# Patient Record
Sex: MALE | Race: WHITE | NOT HISPANIC OR LATINO | URBAN - METROPOLITAN AREA
[De-identification: names, ages, dates, MRNs, and addresses within clinical notes are randomized per-mention and may not be internally consistent; named-entity substitution may affect disease eponyms.]

---

## 2018-09-05 ENCOUNTER — OFFICE VISIT - RIVER FALLS (OUTPATIENT)
Dept: FAMILY MEDICINE | Facility: CLINIC | Age: 58
End: 2018-09-05

## 2018-09-05 ASSESSMENT — MIFFLIN-ST. JEOR: SCORE: 2218.1

## 2021-09-28 ENCOUNTER — OFFICE VISIT - RIVER FALLS (OUTPATIENT)
Dept: FAMILY MEDICINE | Facility: CLINIC | Age: 61
End: 2021-09-28

## 2021-09-28 ASSESSMENT — MIFFLIN-ST. JEOR: SCORE: 2208.12

## 2022-02-12 VITALS
HEIGHT: 73 IN | HEART RATE: 60 BPM | OXYGEN SATURATION: 98 % | WEIGHT: 297 LBS | TEMPERATURE: 97.2 F | DIASTOLIC BLOOD PRESSURE: 78 MMHG | SYSTOLIC BLOOD PRESSURE: 120 MMHG | RESPIRATION RATE: 20 BRPM | BODY MASS INDEX: 39.36 KG/M2

## 2022-02-12 VITALS
SYSTOLIC BLOOD PRESSURE: 126 MMHG | DIASTOLIC BLOOD PRESSURE: 70 MMHG | HEART RATE: 64 BPM | TEMPERATURE: 97.4 F | HEIGHT: 73 IN | WEIGHT: 299.2 LBS | BODY MASS INDEX: 39.65 KG/M2

## 2022-02-15 NOTE — NURSING NOTE
CAGE Assessment Entered On:  9/28/2021 11:55 AM CDT    Performed On:  9/28/2021 11:55 AM CDT by Darren Israel CMA               Assessment   Have you ever felt you should cut down on your drinking :   No   Have people annoyed you by criticizing your drinking :   No   Have you ever felt bad or guilty about your drinking :   No   Have you ever taken a drink first thing in the morning to steady your nerves or get rid of a hangover (Eye-opener) :   No   CAGE Score :   0    Darren Israel CMA - 9/28/2021 11:55 AM CDT

## 2022-02-15 NOTE — PROGRESS NOTES
Patient:   OFELIA LEZAMA            MRN: [id number]            FIN: [id number]               Age:   61 years     Sex:  Male     :  1960   Associated Diagnoses:   Epistaxis   Author:   Camilo Phillip PA-C      Chief Complaint   2021 10:46 AM CDT   Pt here for episodes of epistaxies from right nostril that happened last night.  Pt states he has had these sx on and off x 2-3 years.      History of Present Illness   Chief complaint and symptoms noted above and confirmed with patient   right nostril nose bleed since last night, it got it to stop but it started again this morning  not on aspirin or blood thinners  has not been cauterized before  it has been happening about once a month for about 2 years      Review of Systems   Constitutional:  Negative.    Ear/Nose/Mouth/Throat:       Epistaxis: Right nostril.    Respiratory:  Negative.       Health Status   Allergies:    Allergic Reactions (Selected)  No known allergies   Medications: not on any regular medications   Problem list:    All Problems  Morbid obesity / SNOMED CT 449326976 / Probable      Histories   Past Medical History:    No active or resolved past medical history items have been selected or recorded.   Family History:    No family history items have been selected or recorded.      Physical Examination   Vital Signs   2021 10:46 AM CDT Temperature Tympanic 97.2 DegF  LOW    Peripheral Pulse Rate 60 bpm    Pulse Site Radial artery    Respiratory Rate 20 br/min    Systolic Blood Pressure 120 mmHg    Diastolic Blood Pressure 78 mmHg    Mean Arterial Pressure 92 mmHg    BP Site Right arm    Oxygen Saturation 98 %      Measurements from flowsheet : Measurements   2021 10:46 AM CDT Height Measured - Standard 72.5 in    Weight Measured - Standard 297 lb    BSA 2.62 m2    Body Mass Index 39.72 kg/m2  HI      General:  No acute distress.    HENT:  left nare is normal; there is active bleeding from the right nare,  oxymetazoline is sprayed  into nostril and nasal clamp is applied, then nare is flushed with saline,  a small area of bleeding is present in anterior nare, briefly treated with silver nitrate, no further bleeding is seen.       Impression and Plan   Diagnosis     Epistaxis ([id number] R04.0).     Summary:  epistaxis is controlled, advised to apply bacitracin to nare bid for next 5 days, because of recurrent nature of this will refer to ENT.    Orders     Orders   Requests (Consults / Referrals):  Referral (Request) (Order): 9/28/2021 11:30 AM CDT, Referred to: Otolaryngology (ENT), Reason for referral: recurrent epistaxis, Epistaxis.     Orders   Charges (Evaluation and Management):  02263 office o/p est low 20-29 min (Charge) (Order): Quantity: 1, Epistaxis.

## 2022-02-15 NOTE — NURSING NOTE
Depression Screening Entered On:  9/28/2021 11:55 AM CDT    Performed On:  9/28/2021 11:55 AM CDT by Darren Israel CMA               Depression Screening   Little Interest - Pleasure in Activities :   Not at all   Feeling Down, Depressed, Hopeless :   Not at all   Initial Depression Screen Score :   0 Score   Poor Appetite or Overeating :   Not at all   Trouble Falling or Staying Asleep :   Not at all   Feeling Tired or Little Energy :   Not at all   Feeling Bad About Yourself :   Not at all   Trouble Concentrating :   Not at all   Moving or Speaking Slowly :   Not at all   Thoughts Better Off Dead or Hurting Self :   Not at all   Difficulty at Work, Home, Getting Along :   Not difficult at all   Detailed Depression Screen Score :   0    Total Depression Screen Score :   0    Darren Israel CMA - 9/28/2021 11:55 AM CDT

## 2022-02-15 NOTE — NURSING NOTE
Comprehensive Intake Entered On:  9/28/2021 10:53 AM CDT    Performed On:  9/28/2021 10:46 AM CDT by Darren Israel CMA               Summary   Chief Complaint :   Pt here for episodes of epistaxies from right nostril that happened last night.  Pt states he has had these sx on and off x 2-3 years.   Weight Measured :   297 lb(Converted to: 297 lb 0 oz, 134.717 kg)    Height Measured :   72.5 in(Converted to: 6 ft 0 in, 184.15 cm)    Body Mass Index :   39.72 kg/m2 (HI)    Body Surface Area :   2.62 m2   Systolic Blood Pressure :   120 mmHg   Diastolic Blood Pressure :   78 mmHg   Mean Arterial Pressure :   92 mmHg   Peripheral Pulse Rate :   60 bpm   BP Site :   Right arm   Pulse Site :   Radial artery   Temperature Tympanic :   97.2 DegF(Converted to: 36.2 DegC)  (LOW)    Respiratory Rate :   20 br/min   Oxygen Saturation :   98 %   Darren Israel CMA - 9/28/2021 10:46 AM CDT   Health Status   Allergies Verified? :   Yes   Medication History Verified? :   Yes   Medical History Verified? :   Yes   Pre-Visit Planning Status :   Not completed   Tobacco Use? :   Never smoker   Darren Israel CMA - 9/28/2021 10:46 AM CDT   Meds / Allergies   (As Of: 9/28/2021 10:53:41 AM CDT)   Allergies (Active)   No known allergies  Estimated Onset Date:   Unspecified ; Created By:   KATELYN Zarate CMA, Jessica; Reaction Status:   Active ; Substance:   No known allergies ; Type:   Allergy ; Updated By:   KATELYN Zarate CMA, Jessica; Reviewed Date:   9/28/2021 10:51 AM CDT        Medication List   (As Of: 9/28/2021 10:53:41 AM CDT)   Prescription/Discharge Order    predniSONE  :   predniSONE ; Status:   Processing ; Ordered As Mnemonic:   predniSONE 20 mg oral tablet ; Ordering Provider:   Richard Bolden MD; Action Display:   Complete ; Catalog Code:   predniSONE ; Order Dt/Tm:   9/28/2021 10:46:57 AM CDT          amoxicillin-clavulanate  :   amoxicillin-clavulanate ; Status:   Processing ; Ordered As Mnemonic:   Augmentin 875 mg oral  tablet ; Ordering Provider:   Richard Bolden MD; Action Display:   Complete ; Catalog Code:   amoxicillin-clavulanate ; Order Dt/Tm:   9/28/2021 10:46:59 AM CDT            Social History   Social History   (As Of: 9/28/2021 10:53:41 AM CDT)   Tobacco:  Current      Never (less than 100 in lifetime), Snuff, 45 year(s).   (Last Updated: 9/28/2021 10:46:47 AM CDT by Darren Israel CMA)          Electronic Cigarette/Vaping:        Electronic Cigarette Use: Never.   (Last Updated: 9/28/2021 10:46:52 AM CDT by Darren Israel CMA)

## 2022-02-15 NOTE — PROGRESS NOTES
Patient:   OFELIA LEZAMA            MRN: [id number]            FIN: [id number]               Age:   58 years     Sex:  Male     :  1960   Associated Diagnoses:   Pneumonia   Author:   Richard Bolden MD      Chief Complaint   2018 8:32 AM CDT     Coughing x 2 weeks     Chief complaint and symptoms noted above confirmed with patient.      History of Present Illness             The patient presents with cough.  The cough is described as hacking and paroxysmal.  The severity of the cough is moderate.  The cough is constant.  The cough has lasted for 2 week(s).  Exacerbating factors consist of lying flat and recent illness.  Relieving factors consist of none.  Associated symptoms consist of chills, fever, denies chest pain, denies nasal congestion and denies rhinorrhea.        Review of Systems   Constitutional:  Negative except as documented in history of present illness.    Ear/Nose/Mouth/Throat:  Negative.    Respiratory:  Negative except as documented in history of present illness.    Cardiovascular:  Negative.       Health Status   Allergies:    Allergic Reactions (Selected)  No known allergies   Medications:  (Selected)      Problem list:    All Problems  Morbid obesity / SNOMED CT [id number] / Probable      Histories   Past Medical History:    No active or resolved past medical history items have been selected or recorded.   Family History:    No family history items have been selected or recorded.   Procedure history:    No active procedure history items have been selected or recorded.   Social History:        Tobacco Assessment: Current            Snuff, 45 year(s).        Physical Examination   Vital Signs   2018 8:32 AM CDT Temperature Tympanic 97.4 DegF  LOW    Peripheral Pulse Rate 64 bpm    Pulse Site Radial artery    HR Method Manual    Systolic Blood Pressure 126 mmHg    Diastolic Blood Pressure 70 mmHg    Mean Arterial Pressure 89 mmHg    BP Site Right arm    BP Method Manual       Measurements from flowsheet : Measurements   9/5/2018 8:32 AM CDT Height Measured - Standard 72.5 in    Weight Measured - Standard 299.2 lb    BSA 2.63 m2    Body Mass Index 40.02 kg/m2  HI      General:  Alert and oriented, No acute distress.    Eye:  Normal conjunctiva.    HENT:  Normocephalic, Oral mucosa is moist, No pharyngeal erythema.    Neck:  Supple, Non-tender.    Respiratory:       Breath sounds: Diminished.    Cardiovascular:  Normal rate, Regular rhythm.       Impression and Plan   Diagnosis     Pneumonia (HXE70-TM J15.9).     Course:  Not improving.    Orders     Orders   Pharmacy:  Augmentin 875 mg oral tablet (Prescribe): 1 tab(s), PO, q12hr, x 10 day(s), # 20 tab(s), 0 Refill(s), Type: Maintenance, Pharmacy: [id number], 1 tab(s) Oral q12 hrs,x10 day(s).     Orders (Selected)   Prescriptions  Prescribed  predniSONE 20 mg oral tablet: = 1 tab(s) ( 20 mg ), PO, Daily, # 7 tab(s), 0 Refill(s), Type: Maintenance, Pharmacy: [id number], 1 tab(s) Oral daily,x7 day(s).     Symptomatic Treatment.     Orders     F/U in 48-72 hours if not improving, sooner if getting worse.